# Patient Record
Sex: MALE | Race: OTHER | HISPANIC OR LATINO | URBAN - METROPOLITAN AREA
[De-identification: names, ages, dates, MRNs, and addresses within clinical notes are randomized per-mention and may not be internally consistent; named-entity substitution may affect disease eponyms.]

---

## 2024-04-27 ENCOUNTER — EMERGENCY (EMERGENCY)
Facility: HOSPITAL | Age: 39
LOS: 1 days | Discharge: HOME | End: 2024-04-27
Attending: EMERGENCY MEDICINE
Payer: SELF-PAY

## 2024-04-27 VITALS
DIASTOLIC BLOOD PRESSURE: 89 MMHG | HEART RATE: 100 BPM | SYSTOLIC BLOOD PRESSURE: 157 MMHG | RESPIRATION RATE: 18 BRPM | TEMPERATURE: 98 F | OXYGEN SATURATION: 99 % | WEIGHT: 199.96 LBS | HEIGHT: 67 IN

## 2024-04-27 PROCEDURE — 99053 MED SERV 10PM-8AM 24 HR FAC: CPT

## 2024-04-27 PROCEDURE — 99284 EMERGENCY DEPT VISIT MOD MDM: CPT

## 2024-04-28 VITALS
TEMPERATURE: 98 F | HEART RATE: 88 BPM | DIASTOLIC BLOOD PRESSURE: 93 MMHG | RESPIRATION RATE: 17 BRPM | OXYGEN SATURATION: 96 % | SYSTOLIC BLOOD PRESSURE: 144 MMHG

## 2024-04-28 PROCEDURE — 93005 ELECTROCARDIOGRAM TRACING: CPT

## 2024-04-28 PROCEDURE — 99284 EMERGENCY DEPT VISIT MOD MDM: CPT | Mod: 25

## 2024-04-28 RX ORDER — FAMOTIDINE 10 MG/ML
20 INJECTION INTRAVENOUS DAILY
Refills: 0 | Status: DISCONTINUED | OUTPATIENT
Start: 2024-04-28 | End: 2024-05-01

## 2024-04-28 RX ORDER — PANTOPRAZOLE SODIUM 20 MG/1
1 TABLET, DELAYED RELEASE ORAL
Qty: 14 | Refills: 0
Start: 2024-04-28 | End: 2024-05-11

## 2024-04-28 RX ORDER — SUCRALFATE 1 G
1 TABLET ORAL ONCE
Refills: 0 | Status: COMPLETED | OUTPATIENT
Start: 2024-04-28 | End: 2024-04-28

## 2024-04-28 RX ADMIN — Medication 30 MILLILITER(S): at 01:43

## 2024-04-28 RX ADMIN — FAMOTIDINE 20 MILLIGRAM(S): 10 INJECTION INTRAVENOUS at 01:28

## 2024-04-28 RX ADMIN — Medication 1 GRAM(S): at 01:43

## 2024-04-28 NOTE — ED ADULT NURSE NOTE - OBJECTIVE STATEMENT
Patient is a 39 year old male complaining of abdominal pain. Patient reports mild burning epistatic pain radiating to mid chest - patient has GERD and symptoms have increased over the week due to stress. On assessment patient is A&Ox4, Breathing comfortably on room air, no accessory muscle use, no cough, chest rise and fall equal, no jvd, no edema, abdomen soft nontender, skin warm and normal for race. Patient denies headache, dizziness, chest pain, palpitations, cough, SOB, urinary symptoms, fevers, chills, weakness at this time.

## 2024-04-28 NOTE — ED ADULT NURSE NOTE - NSFALLUNIVINTERV_ED_ALL_ED
Bed/Stretcher in lowest position, wheels locked, appropriate side rails in place/Call bell, personal items and telephone in reach/Instruct patient to call for assistance before getting out of bed/chair/stretcher/Non-slip footwear applied when patient is off stretcher/[place] to call system/Physically safe environment - no spills, clutter or unnecessary equipment/Purposeful proactive rounding/Room/bathroom lighting operational, light cord in reach

## 2024-04-28 NOTE — ED PROVIDER NOTE - PROGRESS NOTE DETAILS
Amor Flores PGY3: pt reassessed, symptomatic improvement with medication. outpatient [id number] regimen and PCP f/u discussed. Return precautions discussed, verbal understanding demonstrated, all questions answered.

## 2024-04-28 NOTE — ED PROVIDER NOTE - PATIENT PORTAL LINK FT
You can access the FollowMyHealth Patient Portal offered by [facility] by registering at the following website: [email or web address]. By joining [employer]’s FollowMyHealth portal, you will also be able to view your health information using other applications (apps) compatible with our system.

## 2024-04-28 NOTE — ED PROVIDER NOTE - ATTENDING CONTRIBUTION TO CARE
------------ATTENDING NOTE------------  pt w/ father c/o weeks of increased stress (over family member's death and trouble at work), he has increased GERD symptoms (mild burning epiststric pain radiating to mid chest), no vomiting or melena/hematochezia, overall soft benign abd, no NSAID use or alcohol use,   - Tomas Yates MD   ----------------------------------------------

## 2024-04-28 NOTE — ED PROVIDER NOTE - PHYSICAL EXAMINATION
GENERAL: well appearing in no acute distress, non-toxic appearing  HEENT:  oral mucosa moist  CARDIAC: regular rate and rhythm, normal S1S2  PULM: normal breath sounds, clear to ascultation bilaterally, no rales, rhonchi, wheezing  GI: Abd soft, nondistended, nontender, no rebound tenderness, no guarding, no rigidity  : no suprapubic tenderness

## 2024-04-28 NOTE — ED POST DISCHARGE NOTE - DETAILS
4/28 pt called reports he can not purchase pantoprazole 40mg OTC, will send rx for 2 weeks per patient's d/c instructions. - MARIBEL EspañaC 4/28 pt called reports he can not purchase pantoprazole 40mg OTC, will send rx per patient's d/c instructions. - MARIBEL EspañaC

## 2024-04-28 NOTE — ED PROVIDER NOTE - CLINICAL SUMMARY MEDICAL DECISION MAKING FREE TEXT BOX
see MD note see MD note    Amor Flores PGY3: 39-year-old male with no previous past medical history presenting with intermittent burning epigastric discomfort with reflux extending up to the esophagus and sour taste in the mouth over the last 2 weeks.  Patient states that symptom onset began after the stress of seeing father-in-law passed away, became nauseated with burning reflux with no vomiting.  Denying any abdominal pain just intermittent burning and reflux sometimes wakes up at night with sour taste in the back of his mouth.  He has tried Tums on several occasions which have relieved the symptoms but symptoms returned several hours later which occurred earlier today.  Patient is denying any current abdominal pain, vomiting, diarrhea, fever/chills, chest pain, shortness of breath, back pain.  Patient mildly hypertensive here 152/89, remaining vital signs here are stable.  Abdomen is soft, ND NT, no suprapubic TTP.  Patient symptoms concerning for GERD, EKG is nonischemic normal sinus rhythm, no cardiac risk factors low concern for ACS as patient is denying current chest pain and symptoms more consistent with alternative diagnosis, reflux possibly in setting of GERD, some stress component given patient's recent loss of life and family.  Less likely a gastritis PUD picture given lack of previous history.  Will give patient symptomatic treatment and reassess, likely DC with acid suppression medication recommendations and PCP follow-up.

## 2024-04-28 NOTE — ED PROVIDER NOTE - NSFOLLOWUPINSTRUCTIONS_ED_ALL_ED_FT
See your Primary Doctor this week for follow up -- call to discuss.    Stay hydrated, avoid spicy / acidic / fried / fatty foods, get plenty of rest.    Use FAMOTIDINE once a day and MAALOX as needed, both as directed -- see medication warnings.    See HEART BURN (GERD) information and return instructions given to you.    Seek immediate medical care for new/worsening symptoms/concerns. See your Primary Doctor this week for follow up -- call to discuss.    Stay hydrated, avoid spicy / acidic / fried / fatty foods, get plenty of rest.    Use FAMOTIDINE 20mg once a day and MAALOX or TUMS as needed, both as directed -- see medication warnings.    Please take 1 tablet of PANTOPRAZOLE 40mg, once a day and follow up with your primary care physician in the next 2 weeks.     See HEART BURN (GERD) information and return instructions given to you.    Seek immediate medical care for new/worsening symptoms/concerns.

## 2024-05-05 ENCOUNTER — EMERGENCY (EMERGENCY)
Facility: HOSPITAL | Age: 39
LOS: 1 days | Discharge: HOME | End: 2024-05-05
Attending: STUDENT IN AN ORGANIZED HEALTH CARE EDUCATION/TRAINING PROGRAM
Payer: SELF-PAY

## 2024-05-05 VITALS
HEART RATE: 93 BPM | HEIGHT: 67 IN | WEIGHT: 190.92 LBS | DIASTOLIC BLOOD PRESSURE: 91 MMHG | RESPIRATION RATE: 18 BRPM | OXYGEN SATURATION: 98 % | TEMPERATURE: 98 F | SYSTOLIC BLOOD PRESSURE: 146 MMHG

## 2024-05-05 PROCEDURE — 99285 EMERGENCY DEPT VISIT HI MDM: CPT

## 2024-05-05 PROCEDURE — 99053 MED SERV 10PM-8AM 24 HR FAC: CPT

## 2024-05-05 NOTE — ED ADULT TRIAGE NOTE - BMI (KG/M2)
Pt needs a new rx on HYDROcodone-acetaminophen (NORCO) 7.5-325 MG per tablet to be sent to [facility] in [place].
Visit before next refill
29.9

## 2024-05-06 VITALS
RESPIRATION RATE: 18 BRPM | TEMPERATURE: 98 F | OXYGEN SATURATION: 98 % | HEART RATE: 78 BPM | DIASTOLIC BLOOD PRESSURE: 94 MMHG | SYSTOLIC BLOOD PRESSURE: 147 MMHG

## 2024-05-06 PROBLEM — K21.9 GASTRO-ESOPHAGEAL REFLUX DISEASE WITHOUT ESOPHAGITIS: Chronic | Status: ACTIVE | Noted: 2024-04-28

## 2024-05-06 LAB
ALBUMIN SERPL ELPH-MCNC: 4 G/DL — SIGNIFICANT CHANGE UP (ref 3.3–5)
ALP SERPL-CCNC: 75 U/L — SIGNIFICANT CHANGE UP (ref 40–120)
ALT FLD-CCNC: 35 U/L — SIGNIFICANT CHANGE UP (ref 10–45)
ANION GAP SERPL CALC-SCNC: 13 MMOL/L — SIGNIFICANT CHANGE UP (ref 5–17)
AST SERPL-CCNC: 29 U/L — SIGNIFICANT CHANGE UP (ref 10–40)
BASOPHILS # BLD AUTO: 0.04 K/UL — SIGNIFICANT CHANGE UP (ref 0–0.2)
BASOPHILS NFR BLD AUTO: 0.4 % — SIGNIFICANT CHANGE UP (ref 0–2)
BILIRUB SERPL-MCNC: 0.4 MG/DL — SIGNIFICANT CHANGE UP (ref 0.2–1.2)
BUN SERPL-MCNC: 14 MG/DL — SIGNIFICANT CHANGE UP (ref 7–23)
CALCIUM SERPL-MCNC: 9.4 MG/DL — SIGNIFICANT CHANGE UP (ref 8.4–10.5)
CHLORIDE SERPL-SCNC: 105 MMOL/L — SIGNIFICANT CHANGE UP (ref 96–108)
CO2 SERPL-SCNC: 22 MMOL/L — SIGNIFICANT CHANGE UP (ref 22–31)
CREAT SERPL-MCNC: 0.87 MG/DL — SIGNIFICANT CHANGE UP (ref 0.5–1.3)
EGFR: 112 ML/MIN/1.73M2 — SIGNIFICANT CHANGE UP
EOSINOPHIL # BLD AUTO: 0.08 K/UL — SIGNIFICANT CHANGE UP (ref 0–0.5)
EOSINOPHIL NFR BLD AUTO: 0.8 % — SIGNIFICANT CHANGE UP (ref 0–6)
GLUCOSE SERPL-MCNC: 97 MG/DL — SIGNIFICANT CHANGE UP (ref 70–99)
HCG SERPL-ACNC: <2 MIU/ML — HIGH
HCT VFR BLD CALC: 41.4 % — SIGNIFICANT CHANGE UP (ref 39–50)
HGB BLD-MCNC: 14 G/DL — SIGNIFICANT CHANGE UP (ref 13–17)
IMM GRANULOCYTES NFR BLD AUTO: 0.2 % — SIGNIFICANT CHANGE UP (ref 0–0.9)
LYMPHOCYTES # BLD AUTO: 3.11 K/UL — SIGNIFICANT CHANGE UP (ref 1–3.3)
LYMPHOCYTES # BLD AUTO: 30.4 % — SIGNIFICANT CHANGE UP (ref 13–44)
MCHC RBC-ENTMCNC: 27.5 PG — SIGNIFICANT CHANGE UP (ref 27–34)
MCHC RBC-ENTMCNC: 33.8 GM/DL — SIGNIFICANT CHANGE UP (ref 32–36)
MCV RBC AUTO: 81.3 FL — SIGNIFICANT CHANGE UP (ref 80–100)
MONOCYTES # BLD AUTO: 0.79 K/UL — SIGNIFICANT CHANGE UP (ref 0–0.9)
MONOCYTES NFR BLD AUTO: 7.7 % — SIGNIFICANT CHANGE UP (ref 2–14)
NEUTROPHILS # BLD AUTO: 6.2 K/UL — SIGNIFICANT CHANGE UP (ref 1.8–7.4)
NEUTROPHILS NFR BLD AUTO: 60.5 % — SIGNIFICANT CHANGE UP (ref 43–77)
NRBC # BLD: 0 /100 WBCS — SIGNIFICANT CHANGE UP (ref 0–0)
PLATELET # BLD AUTO: 283 K/UL — SIGNIFICANT CHANGE UP (ref 150–400)
POTASSIUM SERPL-MCNC: 5.3 MMOL/L — SIGNIFICANT CHANGE UP (ref 3.5–5.3)
POTASSIUM SERPL-SCNC: 5.3 MMOL/L — SIGNIFICANT CHANGE UP (ref 3.5–5.3)
PROT SERPL-MCNC: 7.2 G/DL — SIGNIFICANT CHANGE UP (ref 6–8.3)
RBC # BLD: 5.09 M/UL — SIGNIFICANT CHANGE UP (ref 4.2–5.8)
RBC # FLD: 12.5 % — SIGNIFICANT CHANGE UP (ref 10.3–14.5)
SODIUM SERPL-SCNC: 140 MMOL/L — SIGNIFICANT CHANGE UP (ref 135–145)
TROPONIN T, HIGH SENSITIVITY RESULT: 6 NG/L — SIGNIFICANT CHANGE UP (ref 0–51)
WBC # BLD: 10.24 K/UL — SIGNIFICANT CHANGE UP (ref 3.8–10.5)
WBC # FLD AUTO: 10.24 K/UL — SIGNIFICANT CHANGE UP (ref 3.8–10.5)

## 2024-05-06 PROCEDURE — 80053 COMPREHEN METABOLIC PANEL: CPT

## 2024-05-06 PROCEDURE — 36415 COLL VENOUS BLD VENIPUNCTURE: CPT

## 2024-05-06 PROCEDURE — 85025 COMPLETE CBC W/AUTO DIFF WBC: CPT

## 2024-05-06 PROCEDURE — 71046 X-RAY EXAM CHEST 2 VIEWS: CPT | Mod: 26

## 2024-05-06 PROCEDURE — 99285 EMERGENCY DEPT VISIT HI MDM: CPT | Mod: 25

## 2024-05-06 PROCEDURE — 93005 ELECTROCARDIOGRAM TRACING: CPT

## 2024-05-06 PROCEDURE — 84484 ASSAY OF TROPONIN QUANT: CPT

## 2024-05-06 PROCEDURE — 71046 X-RAY EXAM CHEST 2 VIEWS: CPT

## 2024-05-06 PROCEDURE — 84702 CHORIONIC GONADOTROPIN TEST: CPT

## 2024-05-06 RX ADMIN — Medication 30 MILLILITER(S): at 05:45

## 2024-05-06 NOTE — ED ADULT NURSE NOTE - OBJECTIVE STATEMENT
40 y/o M presents to the ED with complaints of panic attack today at work. Pt also reports chills and weakness. Pt denies fever, recent travel, chest pain, sob, ha, n/v/d. pt A&Ox3 gross neuro intact, no difficulty speaking in complete sentences, pulses x 4, kim x4, skin intact. family at bedside

## 2024-05-06 NOTE — ED PROVIDER NOTE - OBJECTIVE STATEMENT
The patient is a 40 y/o M with past medical history GERD presenting with The patient is a 38 y/o M with past medical history GERD presenting for evaluation s/p episode of chills and epigastric discomfort. Had generalized chills for few minutes around 9pm just after coming home from work. 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 Has been having ongoing epigastric discomfort for past few weeks, was seen here and has been taking maalox and famotidine, states took these today but did not help. No recent fever, chills, nausea, vomiting, diarrhea, or any other symptoms. Does note has been increasingly stressed due to the death of his father in law 1 month ago.

## 2024-05-06 NOTE — ED PROVIDER NOTE - NSFOLLOWUPINSTRUCTIONS_ED_ALL_ED_FT
- Follow up with your doctor in 1 week - bring copies of your results if you were given. If you do not have a primary doctor, please call [phone] to find one convenient for you    - Return to the ED for any new, worsening, or concerning symptoms to you    - Continue all prescribed medications    - Take tylenol as needed for pain    - Rest and keep yourself hydrated with fluids

## 2024-05-06 NOTE — ED PROVIDER NOTE - PHYSICAL EXAMINATION
General: no acute distress  Psych: mood appropriate  Head: normocephalic; atraumatic  Eyes: conjunctivae clear bilaterally, sclerae anicteric  ENT: no nasal flaring, patent nares  Cardio: [regular rate and rhythm; normal heart sounds]  Resp: [clear to auscultation bilaterally]  GI: [abdomen soft, nontender, nondistended]  [: ][no CVA tenderness]  Neuro: [strength 5/5 in upper and lower extremities; normal sensation]  Skin: [no rashes or bruising noted]  MSK: [normal movement of extremities]  Lymph/Vasc: [no LE edema] General: no acute distress  Psych: mood appropriate  Head: normocephalic; atraumatic  Eyes: conjunctivae clear bilaterally, sclerae anicteric  ENT: no nasal flaring, patent nares  Cardio: regular rate and rhythm; normal heart sounds  Resp: clear to auscultation bilaterally  GI: abdomen soft, nontender, nondistended  Neuro: A&Ox3  Skin: no rashes or bruising noted  MSK: normal movement of extremities; no calf tenderness; no chest wall tenderness  Lymph/Vasc: no LE edema

## 2024-05-06 NOTE — ED PROVIDER NOTE - PATIENT PORTAL LINK FT
You can access the FollowMyHealth Patient Portal offered by [facility] by registering at the following website: [email or web address]. By joining [employer]’s FollowMyHealth portal, you will also be able to view your health information using other applications (apps) compatible with our system.

## 2024-05-06 NOTE — ED PROVIDER NOTE - ATTENDING CONTRIBUTION TO CARE
I, Neftaly Burkett, have performed a history and physical exam on this patient, and discussed their management with the resident. I have fully participated in the care of this patient. I agree with the history, physical exam, and plan as documented by the resident

## 2024-05-06 NOTE — ED PROVIDER NOTE - CLINICAL SUMMARY MEDICAL DECISION MAKING FREE TEXT BOX
SKIP Andrews PGY1- patient here s/p episode generalized chills, with ongoing epigastric discomfort similar to GERD, notes recent death in family of someone close to him in past month and has been having increased epigastric discomfort since that time. Vitals WNL, no focal abnormal exam findings. SKIP Andrews PGY1- patient here s/p episode generalized chills, with ongoing epigastric discomfort similar to GERD, notes recent death in family of someone close to him in past month and has been having increased epigastric discomfort since that time. Vitals WNL, no focal abnormal exam findings. Less likely acs, more likely GERD possibly exacerbated by recent grief. Will obtain ekg, labs, troponin, CXR. Anticipate dc home. 38 yo F presenting for episode of generalized chills, epigastric discomfort, anxiety. No SOB. No cough/congestion. Notes recent death in family of someone close to him in past month and has been having increased epigastric discomfort since that time. Vitals WNL, no focal abnormal exam findings. CTAB, heart RRR. ECG w/o acute ischemic changes, NSR. Low concern for cardiac ischemia, more likely anxiety/GERD/gastritis possibly exacerbated by recent grief. Less likely PUD. Abd exam benign, non ttp throughout. Will obtain labs, troponin, CXR. Provide symptomatic tx. Anticipate dc home w/ outpt f/u if no findings requiring further inpatient testing.

## 2024-05-06 NOTE — ED PROVIDER NOTE - CARE PLAN
1 Principal Discharge DX:	GERD (gastroesophageal reflux disease)   Principal Discharge DX:	Epigastric discomfort